# Patient Record
Sex: FEMALE | Race: OTHER | NOT HISPANIC OR LATINO | URBAN - METROPOLITAN AREA
[De-identification: names, ages, dates, MRNs, and addresses within clinical notes are randomized per-mention and may not be internally consistent; named-entity substitution may affect disease eponyms.]

---

## 2023-04-10 ENCOUNTER — OUTPATIENT (OUTPATIENT)
Dept: OUTPATIENT SERVICES | Facility: HOSPITAL | Age: 58
LOS: 1 days | End: 2023-04-10
Payer: COMMERCIAL

## 2023-04-10 VITALS
SYSTOLIC BLOOD PRESSURE: 168 MMHG | RESPIRATION RATE: 14 BRPM | OXYGEN SATURATION: 97 % | WEIGHT: 225.09 LBS | HEART RATE: 73 BPM | HEIGHT: 62 IN | TEMPERATURE: 98 F | DIASTOLIC BLOOD PRESSURE: 94 MMHG

## 2023-04-10 DIAGNOSIS — Z98.890 OTHER SPECIFIED POSTPROCEDURAL STATES: Chronic | ICD-10-CM

## 2023-04-10 DIAGNOSIS — N84.0 POLYP OF CORPUS UTERI: ICD-10-CM

## 2023-04-10 DIAGNOSIS — Z98.891 HISTORY OF UTERINE SCAR FROM PREVIOUS SURGERY: Chronic | ICD-10-CM

## 2023-04-10 DIAGNOSIS — N85.00 ENDOMETRIAL HYPERPLASIA, UNSPECIFIED: ICD-10-CM

## 2023-04-10 DIAGNOSIS — Z01.818 ENCOUNTER FOR OTHER PREPROCEDURAL EXAMINATION: ICD-10-CM

## 2023-04-10 DIAGNOSIS — N89.8 OTHER SPECIFIED NONINFLAMMATORY DISORDERS OF VAGINA: Chronic | ICD-10-CM

## 2023-04-10 LAB
ALBUMIN SERPL ELPH-MCNC: 4 G/DL — SIGNIFICANT CHANGE UP (ref 3.3–5)
ALP SERPL-CCNC: 99 U/L — SIGNIFICANT CHANGE UP (ref 40–120)
ALT FLD-CCNC: 26 U/L — SIGNIFICANT CHANGE UP (ref 12–78)
ANION GAP SERPL CALC-SCNC: 5 MMOL/L — SIGNIFICANT CHANGE UP (ref 5–17)
AST SERPL-CCNC: 21 U/L — SIGNIFICANT CHANGE UP (ref 15–37)
BILIRUB SERPL-MCNC: 0.2 MG/DL — SIGNIFICANT CHANGE UP (ref 0.2–1.2)
BUN SERPL-MCNC: 17 MG/DL — SIGNIFICANT CHANGE UP (ref 7–23)
CALCIUM SERPL-MCNC: 9.8 MG/DL — SIGNIFICANT CHANGE UP (ref 8.5–10.1)
CHLORIDE SERPL-SCNC: 102 MMOL/L — SIGNIFICANT CHANGE UP (ref 96–108)
CO2 SERPL-SCNC: 31 MMOL/L — SIGNIFICANT CHANGE UP (ref 22–31)
CREAT SERPL-MCNC: 1.1 MG/DL — SIGNIFICANT CHANGE UP (ref 0.5–1.3)
EGFR: 58 ML/MIN/1.73M2 — LOW
GLUCOSE SERPL-MCNC: 123 MG/DL — HIGH (ref 70–99)
HCT VFR BLD CALC: 41.9 % — SIGNIFICANT CHANGE UP (ref 34.5–45)
HGB BLD-MCNC: 13.6 G/DL — SIGNIFICANT CHANGE UP (ref 11.5–15.5)
MCHC RBC-ENTMCNC: 28.7 PG — SIGNIFICANT CHANGE UP (ref 27–34)
MCHC RBC-ENTMCNC: 32.5 GM/DL — SIGNIFICANT CHANGE UP (ref 32–36)
MCV RBC AUTO: 88.4 FL — SIGNIFICANT CHANGE UP (ref 80–100)
NRBC # BLD: 0 /100 WBCS — SIGNIFICANT CHANGE UP (ref 0–0)
PLATELET # BLD AUTO: 244 K/UL — SIGNIFICANT CHANGE UP (ref 150–400)
POTASSIUM SERPL-MCNC: 3.7 MMOL/L — SIGNIFICANT CHANGE UP (ref 3.5–5.3)
POTASSIUM SERPL-SCNC: 3.7 MMOL/L — SIGNIFICANT CHANGE UP (ref 3.5–5.3)
PROT SERPL-MCNC: 8.7 G/DL — HIGH (ref 6–8.3)
RBC # BLD: 4.74 M/UL — SIGNIFICANT CHANGE UP (ref 3.8–5.2)
RBC # FLD: 14.5 % — SIGNIFICANT CHANGE UP (ref 10.3–14.5)
SODIUM SERPL-SCNC: 138 MMOL/L — SIGNIFICANT CHANGE UP (ref 135–145)
WBC # BLD: 7.54 K/UL — SIGNIFICANT CHANGE UP (ref 3.8–10.5)
WBC # FLD AUTO: 7.54 K/UL — SIGNIFICANT CHANGE UP (ref 3.8–10.5)

## 2023-04-10 PROCEDURE — 80053 COMPREHEN METABOLIC PANEL: CPT

## 2023-04-10 PROCEDURE — 36415 COLL VENOUS BLD VENIPUNCTURE: CPT

## 2023-04-10 PROCEDURE — G0463: CPT

## 2023-04-10 PROCEDURE — 86901 BLOOD TYPING SEROLOGIC RH(D): CPT

## 2023-04-10 PROCEDURE — 86900 BLOOD TYPING SEROLOGIC ABO: CPT

## 2023-04-10 PROCEDURE — 86850 RBC ANTIBODY SCREEN: CPT

## 2023-04-10 PROCEDURE — 85027 COMPLETE CBC AUTOMATED: CPT

## 2023-04-10 PROCEDURE — 93010 ELECTROCARDIOGRAM REPORT: CPT

## 2023-04-10 PROCEDURE — 93005 ELECTROCARDIOGRAM TRACING: CPT

## 2023-04-10 PROCEDURE — 83036 HEMOGLOBIN GLYCOSYLATED A1C: CPT

## 2023-04-10 NOTE — H&P PST ADULT - NEGATIVE GENERAL SYMPTOMS
Notes prior H/O COVID February 2022 - has received 3 doses of Pfizer vaccine/no fever/no chills/no sweating

## 2023-04-10 NOTE — H&P PST ADULT - GENERAL COMMENTS
Notes she was in [place] for the weekend - Denies any recent exposure to anyone with known or suspected covid - denies any current covid symptoms

## 2023-04-10 NOTE — H&P PST ADULT - NSICDXPASTSURGICALHX_GEN_ALL_CORE_FT
PAST SURGICAL HISTORY:  H/O  section     H/O colonoscopy     History of gynecologic surgery     Vaginal cyst

## 2023-04-10 NOTE — H&P PST ADULT - LAST STRESS TEST
Notes she had a stress test " probably like at least 10 years ago"  pt notes "I have no idea why they did it"- denies current follow up with cardiology

## 2023-04-10 NOTE — H&P PST ADULT - PROBLEM SELECTOR PLAN 1
PST labs; CBC, CMP, Type & Screen, EKG. Medical clearance with PCP Dr Sarabjit Robins. Pt instructed to stop any NSAIDS/Herbal Supplements one week prior to procedure. May take Tylenol if needed for any pain between now and procedure. Morning of procedure she may take her Levothyroxine with small sip of water however she will HOLD her HCTZ. pre-op instructions given to pt with understanding verbalized. All questions addressed with pt prior to her leaving the PST department today.

## 2023-04-10 NOTE — H&P PST ADULT - NEGATIVE ENMT SYMPTOMS
no hearing difficulty/no sinus symptoms/no nasal congestion/no dry mouth/no throat pain/no dysphagia

## 2023-04-10 NOTE — H&P PST ADULT - ASSESSMENT
57 year old female  ( one set of twins) PMH HTN, Hypercholesterolemia, Hypothyroidism, Pre-Diabetes, COVID-19 (2022); now with Endometrial Hyperplasia Unspecified, Polyp Of Corpus Uteri; scheduled for Dilation & Curettage Hysteroscopy With Myosure with Dr Kathy Melara on 2023.

## 2023-04-10 NOTE — H&P PST ADULT - HISTORY OF PRESENT ILLNESS
57 year old female  ( one set of twins) PMH HTN, Hypercholesterolemia, Hypothyroidism, Pre-Diabetes, COVID-19 (2022); now with Endometrial Hyperplasia Unspecified, Polyp Of Corpus Uteri; presents today for PST prior to Dilation & Curettage hysteroscopy With Myosure with Dr Kathy Melara on 2023.     Pt notes she had stopped having menses at age 50 then  she notes she developed post menopausal bleeding at around age 52. Pt notes "the doctor I had at the time was just monitoring it." Pt notes she started seeing Dr Melara about 2 years ago. Notes intermittent bleeding  for one to two days every 3 months or so. Pt notes she underwent transvaginal sonogram with Dr Melara which showed Polyps. Following discussions with Dr Melara regarding treatment options pt is electing for scheduled procedure.

## 2023-04-10 NOTE — H&P PST ADULT - LAST CARDIAC ANGIOGRAM/IMAGING
Notes prior H/O Cardiac Cath she thinks at [facility] DENIES ANY STENT PLACEMENT - pt states "I have no idea why they did it to me or why I did it" - Does not currently follow with cardiology

## 2023-04-10 NOTE — H&P PST ADULT - NSICDXFAMILYHX_GEN_ALL_CORE_FT
FAMILY HISTORY:  Father  Still living? No  Family history of coronary artery disease in father, Age at diagnosis: Age Unknown  Family history of prostate cancer, Age at diagnosis: Age Unknown    Mother  Still living? No  Family history of colon cancer in mother, Age at diagnosis: Age Unknown

## 2023-04-10 NOTE — H&P PST ADULT - NSICDXPASTMEDICALHX_GEN_ALL_CORE_FT
PAST MEDICAL HISTORY:  Endometrial hyperplasia, unspecified     Hypercholesterolemia     Hypertension     Hypothyroidism     Polyp of corpus uteri

## 2023-04-11 LAB
A1C WITH ESTIMATED AVERAGE GLUCOSE RESULT: 7.8 % — HIGH (ref 4–5.6)
ESTIMATED AVERAGE GLUCOSE: 177 MG/DL — HIGH (ref 68–114)

## 2023-12-22 PROBLEM — N84.0 POLYP OF CORPUS UTERI: Chronic | Status: ACTIVE | Noted: 2023-04-10

## 2023-12-22 PROBLEM — E78.00 PURE HYPERCHOLESTEROLEMIA, UNSPECIFIED: Chronic | Status: ACTIVE | Noted: 2023-04-10

## 2023-12-22 PROBLEM — N85.00 ENDOMETRIAL HYPERPLASIA, UNSPECIFIED: Chronic | Status: ACTIVE | Noted: 2023-04-10

## 2023-12-22 PROBLEM — E03.9 HYPOTHYROIDISM, UNSPECIFIED: Chronic | Status: ACTIVE | Noted: 2023-04-10

## 2023-12-22 PROBLEM — I10 ESSENTIAL (PRIMARY) HYPERTENSION: Chronic | Status: ACTIVE | Noted: 2023-04-10

## 2024-01-02 ENCOUNTER — OUTPATIENT (OUTPATIENT)
Dept: OUTPATIENT SERVICES | Facility: HOSPITAL | Age: 59
LOS: 1 days | End: 2024-01-02
Payer: COMMERCIAL

## 2024-01-02 VITALS
TEMPERATURE: 97 F | SYSTOLIC BLOOD PRESSURE: 159 MMHG | RESPIRATION RATE: 14 BRPM | OXYGEN SATURATION: 96 % | HEART RATE: 62 BPM | WEIGHT: 220.02 LBS | HEIGHT: 62 IN | DIASTOLIC BLOOD PRESSURE: 81 MMHG

## 2024-01-02 DIAGNOSIS — Z98.890 OTHER SPECIFIED POSTPROCEDURAL STATES: Chronic | ICD-10-CM

## 2024-01-02 DIAGNOSIS — N93.8 OTHER SPECIFIED ABNORMAL UTERINE AND VAGINAL BLEEDING: ICD-10-CM

## 2024-01-02 DIAGNOSIS — N95.0 POSTMENOPAUSAL BLEEDING: ICD-10-CM

## 2024-01-02 DIAGNOSIS — N89.8 OTHER SPECIFIED NONINFLAMMATORY DISORDERS OF VAGINA: Chronic | ICD-10-CM

## 2024-01-02 DIAGNOSIS — Z01.818 ENCOUNTER FOR OTHER PREPROCEDURAL EXAMINATION: ICD-10-CM

## 2024-01-02 DIAGNOSIS — Z98.891 HISTORY OF UTERINE SCAR FROM PREVIOUS SURGERY: Chronic | ICD-10-CM

## 2024-01-02 LAB
A1C WITH ESTIMATED AVERAGE GLUCOSE RESULT: 7.2 % — HIGH (ref 4–5.6)
A1C WITH ESTIMATED AVERAGE GLUCOSE RESULT: 7.2 % — HIGH (ref 4–5.6)
ALBUMIN SERPL ELPH-MCNC: 3.8 G/DL — SIGNIFICANT CHANGE UP (ref 3.3–5)
ALBUMIN SERPL ELPH-MCNC: 3.8 G/DL — SIGNIFICANT CHANGE UP (ref 3.3–5)
ALP SERPL-CCNC: 87 U/L — SIGNIFICANT CHANGE UP (ref 40–120)
ALP SERPL-CCNC: 87 U/L — SIGNIFICANT CHANGE UP (ref 40–120)
ALT FLD-CCNC: 21 U/L — SIGNIFICANT CHANGE UP (ref 12–78)
ALT FLD-CCNC: 21 U/L — SIGNIFICANT CHANGE UP (ref 12–78)
ANION GAP SERPL CALC-SCNC: 7 MMOL/L — SIGNIFICANT CHANGE UP (ref 5–17)
ANION GAP SERPL CALC-SCNC: 7 MMOL/L — SIGNIFICANT CHANGE UP (ref 5–17)
AST SERPL-CCNC: 13 U/L — LOW (ref 15–37)
AST SERPL-CCNC: 13 U/L — LOW (ref 15–37)
BILIRUB SERPL-MCNC: 0.4 MG/DL — SIGNIFICANT CHANGE UP (ref 0.2–1.2)
BILIRUB SERPL-MCNC: 0.4 MG/DL — SIGNIFICANT CHANGE UP (ref 0.2–1.2)
BUN SERPL-MCNC: 15 MG/DL — SIGNIFICANT CHANGE UP (ref 7–23)
BUN SERPL-MCNC: 15 MG/DL — SIGNIFICANT CHANGE UP (ref 7–23)
CALCIUM SERPL-MCNC: 9.3 MG/DL — SIGNIFICANT CHANGE UP (ref 8.5–10.1)
CALCIUM SERPL-MCNC: 9.3 MG/DL — SIGNIFICANT CHANGE UP (ref 8.5–10.1)
CHLORIDE SERPL-SCNC: 105 MMOL/L — SIGNIFICANT CHANGE UP (ref 96–108)
CHLORIDE SERPL-SCNC: 105 MMOL/L — SIGNIFICANT CHANGE UP (ref 96–108)
CO2 SERPL-SCNC: 26 MMOL/L — SIGNIFICANT CHANGE UP (ref 22–31)
CO2 SERPL-SCNC: 26 MMOL/L — SIGNIFICANT CHANGE UP (ref 22–31)
CREAT SERPL-MCNC: 1.1 MG/DL — SIGNIFICANT CHANGE UP (ref 0.5–1.3)
CREAT SERPL-MCNC: 1.1 MG/DL — SIGNIFICANT CHANGE UP (ref 0.5–1.3)
EGFR: 58 ML/MIN/1.73M2 — LOW
EGFR: 58 ML/MIN/1.73M2 — LOW
ESTIMATED AVERAGE GLUCOSE: 160 MG/DL — HIGH (ref 68–114)
ESTIMATED AVERAGE GLUCOSE: 160 MG/DL — HIGH (ref 68–114)
GLUCOSE SERPL-MCNC: 108 MG/DL — HIGH (ref 70–99)
GLUCOSE SERPL-MCNC: 108 MG/DL — HIGH (ref 70–99)
HCT VFR BLD CALC: 42.8 % — SIGNIFICANT CHANGE UP (ref 34.5–45)
HCT VFR BLD CALC: 42.8 % — SIGNIFICANT CHANGE UP (ref 34.5–45)
HGB BLD-MCNC: 14 G/DL — SIGNIFICANT CHANGE UP (ref 11.5–15.5)
HGB BLD-MCNC: 14 G/DL — SIGNIFICANT CHANGE UP (ref 11.5–15.5)
MCHC RBC-ENTMCNC: 28.9 PG — SIGNIFICANT CHANGE UP (ref 27–34)
MCHC RBC-ENTMCNC: 28.9 PG — SIGNIFICANT CHANGE UP (ref 27–34)
MCHC RBC-ENTMCNC: 32.7 GM/DL — SIGNIFICANT CHANGE UP (ref 32–36)
MCHC RBC-ENTMCNC: 32.7 GM/DL — SIGNIFICANT CHANGE UP (ref 32–36)
MCV RBC AUTO: 88.2 FL — SIGNIFICANT CHANGE UP (ref 80–100)
MCV RBC AUTO: 88.2 FL — SIGNIFICANT CHANGE UP (ref 80–100)
NRBC # BLD: 0 /100 WBCS — SIGNIFICANT CHANGE UP (ref 0–0)
NRBC # BLD: 0 /100 WBCS — SIGNIFICANT CHANGE UP (ref 0–0)
PLATELET # BLD AUTO: 226 K/UL — SIGNIFICANT CHANGE UP (ref 150–400)
PLATELET # BLD AUTO: 226 K/UL — SIGNIFICANT CHANGE UP (ref 150–400)
POTASSIUM SERPL-MCNC: 3.6 MMOL/L — SIGNIFICANT CHANGE UP (ref 3.5–5.3)
POTASSIUM SERPL-MCNC: 3.6 MMOL/L — SIGNIFICANT CHANGE UP (ref 3.5–5.3)
POTASSIUM SERPL-SCNC: 3.6 MMOL/L — SIGNIFICANT CHANGE UP (ref 3.5–5.3)
POTASSIUM SERPL-SCNC: 3.6 MMOL/L — SIGNIFICANT CHANGE UP (ref 3.5–5.3)
PROT SERPL-MCNC: 8.4 G/DL — HIGH (ref 6–8.3)
PROT SERPL-MCNC: 8.4 G/DL — HIGH (ref 6–8.3)
RBC # BLD: 4.85 M/UL — SIGNIFICANT CHANGE UP (ref 3.8–5.2)
RBC # BLD: 4.85 M/UL — SIGNIFICANT CHANGE UP (ref 3.8–5.2)
RBC # FLD: 14.6 % — HIGH (ref 10.3–14.5)
RBC # FLD: 14.6 % — HIGH (ref 10.3–14.5)
SODIUM SERPL-SCNC: 138 MMOL/L — SIGNIFICANT CHANGE UP (ref 135–145)
SODIUM SERPL-SCNC: 138 MMOL/L — SIGNIFICANT CHANGE UP (ref 135–145)
WBC # BLD: 5.94 K/UL — SIGNIFICANT CHANGE UP (ref 3.8–10.5)
WBC # BLD: 5.94 K/UL — SIGNIFICANT CHANGE UP (ref 3.8–10.5)
WBC # FLD AUTO: 5.94 K/UL — SIGNIFICANT CHANGE UP (ref 3.8–10.5)
WBC # FLD AUTO: 5.94 K/UL — SIGNIFICANT CHANGE UP (ref 3.8–10.5)

## 2024-01-02 PROCEDURE — 85027 COMPLETE CBC AUTOMATED: CPT

## 2024-01-02 PROCEDURE — 83036 HEMOGLOBIN GLYCOSYLATED A1C: CPT

## 2024-01-02 PROCEDURE — 36415 COLL VENOUS BLD VENIPUNCTURE: CPT

## 2024-01-02 PROCEDURE — 93005 ELECTROCARDIOGRAM TRACING: CPT

## 2024-01-02 PROCEDURE — 86900 BLOOD TYPING SEROLOGIC ABO: CPT

## 2024-01-02 PROCEDURE — 86850 RBC ANTIBODY SCREEN: CPT

## 2024-01-02 PROCEDURE — 80053 COMPREHEN METABOLIC PANEL: CPT

## 2024-01-02 PROCEDURE — 93010 ELECTROCARDIOGRAM REPORT: CPT

## 2024-01-02 PROCEDURE — 86901 BLOOD TYPING SEROLOGIC RH(D): CPT

## 2024-01-02 PROCEDURE — G0463: CPT

## 2024-01-02 RX ORDER — HYDROCHLOROTHIAZIDE 25 MG
1 TABLET ORAL
Refills: 0 | DISCHARGE

## 2024-01-02 RX ORDER — VERAPAMIL HCL 240 MG
1 CAPSULE, EXTENDED RELEASE PELLETS 24 HR ORAL
Refills: 0 | DISCHARGE

## 2024-01-02 RX ORDER — METFORMIN HYDROCHLORIDE 850 MG/1
1 TABLET ORAL
Refills: 0 | DISCHARGE

## 2024-01-02 RX ORDER — CHOLECALCIFEROL (VITAMIN D3) 125 MCG
0 CAPSULE ORAL
Refills: 0 | DISCHARGE

## 2024-01-02 RX ORDER — ATORVASTATIN CALCIUM 80 MG/1
1 TABLET, FILM COATED ORAL
Refills: 0 | DISCHARGE

## 2024-01-02 RX ORDER — LEVOTHYROXINE SODIUM 125 MCG
1 TABLET ORAL
Refills: 0 | DISCHARGE

## 2024-01-02 NOTE — H&P PST ADULT - NS PRO LAST MENSTRUAL DATE
N/A Menopausal ( notes LMP was initially age 50) - now having postmenopausal bleeding/not applicable

## 2024-01-02 NOTE — H&P PST ADULT - LAST STRESS TEST
Notes "I had a stress test a LING time ago due to my blood pressure and it was ok at the time- does not normally follow with cardiologist sees PCP

## 2024-01-02 NOTE — H&P PST ADULT - OTHER CARE PROVIDERS
Patient is requesting a medication refill - medication is on active medication list    Patient is currently OUT of the requested medication.    RX Name and Dose:  (copy from med list)  Atenolol 100 MG Oral Tablet New     Add as: atenolol (TENORMIN) 100 MG tablet     Take 1 tablet by mouth  every morning     Triamterene-HCTZ 37.5-25 MG Oral Capsule New     Add as: triamterene-hydrochlorothiazide (DYAZIDE) 37.5-25 MG per capsule     TAKE 1 CAPSULE BY MOUTH EVERY MORNING           Duration: 30 days    Pharmacy    [place] [address] [place] [address]  [address] [facility] [address]  Phone: [phone] Fax: [phone]      Caller Information       Type Contact Phone    12/05/2018 11:10 AM Phone (Incoming) [place] [address] [place] [address] (Pharmacy) [phone]          Alternative phone number: [phone]    Turnaround time given to caller:   \"This message will be sent to [state Provider's name]. The clinical team will fulfill your request as soon as they review your message.\"  
PCP- Dr Sarabjit Robins

## 2024-01-02 NOTE — H&P PST ADULT - LAST CARDIAC ANGIOGRAM/IMAGING
Notes prior H/o Cardiac Cath - DENIES ANY STENT PLACEMENT - notes this was probably back in 2005 - does not remember where procedure was done

## 2024-01-02 NOTE — H&P PST ADULT - ASSESSMENT
58 year old female   PMH HTN, Hypercholesterolemia, Hypothyroidism, Type 2 DM, COVID-19, Polyp Of Corpus Uteri, Endometrial Hyperplasia Unspecified; now with Postmenopausal Bleeding,  Other Specified Abnormal Uterine and Vaginal Bleeding; scheduled for Dilation & Curettage Hysteroscopy - possible Myosure with Dr Kathy Melara on 2024.

## 2024-01-02 NOTE — H&P PST ADULT - PROBLEM SELECTOR PLAN 1
PST labs; CBC, CMP, HgB A1C, Type & Screen, EKG. Pt to make appointment today with PCP for medical clearance. Will fax over PST results to PCP for review and medical clearance. PST labs; CBC, CMP, HgB A1C, Type & Screen, EKG. Pt to make appointment today with PCP for medical clearance. Will fax over PST results to PCP for review and medical clearance. Pt instructed to stop any NSAIDS/Herbal Supplements/Multivitamin one week prior to procedure. May take Tylenol if needed for any pain between now and procedure.   Pt notes she does not see Endo for management of her Diabetes that her PCP manages her diabetes. Does not check blood glucose at home however notes her A1C runs in the 6's. Discussed with pt if today's A1C comes back> 9 she would need to see Endo for clearance. Pt was instructed to HOLD her Metformin day prior to procedure as well as morning of procedure.  Morning of procedure she may take her Levothyroxine with small sip of water (however she will hold her HCTZ).    She will also continue her Verapamil & Atorvastatin at night as per her usual routine. Pre-op instructions given to pt with understanding verbalized. All questions addressed with pt prior to her leaving the PST department today.

## 2024-01-02 NOTE — H&P PST ADULT - HISTORY OF PRESENT ILLNESS
58 year old female   PMH HTN, Hypercholesterolemia, Hypothyroidism, Type 2 DM, COVID-19, Polyp Of Corpus Uteri, Endometrial Hyperplasia Unspecified; now with Postmenopausal Bleeding,  Other Specified Abnormal Uterine and Vaginal Bleeding; presents today for PST prior to Dilation & Curettage Hysteroscopy - possible Myosure with Dr Kathy Melara on 2024.     Pt notes "I am having some bleeding post- menopause on and off for the past few years and they did a sonogram and found a polyp." (pt notes she went into menopause at age 50). Denies any cramping or abdominal pain.   Following exam, testing and discussions with Dr Melara regarding treatment options pt is electing for scheduled procedure.

## 2024-01-02 NOTE — H&P PST ADULT - NSICDXPASTMEDICALHX_GEN_ALL_CORE_FT
PAST MEDICAL HISTORY:  2019 novel coronavirus disease (COVID-19)     Endometrial hyperplasia, unspecified     Hypercholesterolemia     Hypertension     Hypothyroidism     Other specified abnormal uterine and vaginal bleeding     Polyp of corpus uteri     Postmenopausal bleeding     Type 2 diabetes mellitus